# Patient Record
Sex: MALE | Race: BLACK OR AFRICAN AMERICAN | Employment: FULL TIME | URBAN - METROPOLITAN AREA
[De-identification: names, ages, dates, MRNs, and addresses within clinical notes are randomized per-mention and may not be internally consistent; named-entity substitution may affect disease eponyms.]

---

## 2024-02-09 ENCOUNTER — HOSPITAL ENCOUNTER (EMERGENCY)
Age: 34
Discharge: HOME | End: 2024-02-09
Payer: COMMERCIAL

## 2024-02-09 VITALS
HEART RATE: 70 BPM | RESPIRATION RATE: 20 BRPM | OXYGEN SATURATION: 99 % | TEMPERATURE: 98 F | SYSTOLIC BLOOD PRESSURE: 157 MMHG | DIASTOLIC BLOOD PRESSURE: 101 MMHG

## 2024-02-09 DIAGNOSIS — K21.9 GASTRIC REFLUX: Primary | ICD-10-CM

## 2024-02-09 PROCEDURE — 6370000000 HC RX 637 (ALT 250 FOR IP): Performed by: PHYSICIAN ASSISTANT

## 2024-02-09 PROCEDURE — 99283 EMERGENCY DEPT VISIT LOW MDM: CPT

## 2024-02-09 RX ORDER — FAMOTIDINE 20 MG/1
20 TABLET, FILM COATED ORAL ONCE
Status: COMPLETED | OUTPATIENT
Start: 2024-02-09 | End: 2024-02-09

## 2024-02-09 RX ORDER — CALCIUM CARBONATE 500 MG/1
1 TABLET, CHEWABLE ORAL 2 TIMES DAILY
Qty: 60 TABLET | Refills: 0 | Status: SHIPPED | OUTPATIENT
Start: 2024-02-09 | End: 2024-03-10

## 2024-02-09 RX ORDER — FAMOTIDINE 20 MG/1
20 TABLET, FILM COATED ORAL 2 TIMES DAILY
Qty: 30 TABLET | Refills: 0 | Status: SHIPPED | OUTPATIENT
Start: 2024-02-09

## 2024-02-09 RX ADMIN — FAMOTIDINE 20 MG: 20 TABLET, FILM COATED ORAL at 18:50

## 2024-02-09 ASSESSMENT — PAIN SCALES - GENERAL: PAINLEVEL_OUTOF10: 9

## 2024-02-09 ASSESSMENT — PAIN - FUNCTIONAL ASSESSMENT: PAIN_FUNCTIONAL_ASSESSMENT: 0-10

## 2024-02-09 NOTE — ED PROVIDER NOTES
[] Radiologist's Report Reviewed:  No orders to display       EKG (if obtained):   Please See Note of attending physician for EKG interpretation.     Chart review shows recent radiograph(s):  No results found.    MDM:     Problems Addressed:  1. Gastric reflux        Interventions given this visit:   Orders Placed This Encounter   Medications    famotidine (PEPCID) tablet 20 mg    famotidine (PEPCID) 20 MG tablet     Sig: Take 1 tablet by mouth 2 times daily     Dispense:  30 tablet     Refill:  0    calcium carbonate (ANTACID) 500 MG chewable tablet     Sig: Take 1 tablet by mouth 2 times daily     Dispense:  60 tablet     Refill:  0      Interventions listed are used to treat Problem list above  Pt presents today with s/sx consistent with reflux. Interventions as above.  Pt will be provided with RX for symptomatic control and educated on return precautions.     .   -DDX   GERD, gatritis       I independently managed patient today in the ED.       BP (!) 157/101   Pulse 70   Temp 98 °F (36.7 °C) (Oral)   Resp 20   SpO2 99%       Clinical Impression:  1. Gastric reflux        Disposition referral (if applicable):  [facility] Emergency Department  [address]  [place] [address]  [phone]    If symptoms worsen or persist    Disposition medications (if applicable):  New Prescriptions    CALCIUM CARBONATE (ANTACID) 500 MG CHEWABLE TABLET    Take 1 tablet by mouth 2 times daily    FAMOTIDINE (PEPCID) 20 MG TABLET    Take 1 tablet by mouth 2 times daily         Comment: Please note this report has been produced using speech recognition software and may contain errors related to that system including errors in grammar, punctuation, and spelling, as well as words and phrases that may be inappropriate. If there are any questions or concerns please feel free to contact the dictating provider for clarification.    Please note Images are personally interpreted by  this Provider (ANGELA Couch. )However final disposition is made with deference to Radiologist interpretation of said images.       LIGIA Mcintyre Jeremy A, PA-C  02/09/24 1908

## 2024-02-09 NOTE — ED TRIAGE NOTES
Patient to triage with c/o pain after eating. Patient states the pain is in his esophagus and it happens every time he eats. Patient also states his tongue is turning white after brushing his teeth. Resps even and unlabored.

## 2024-05-10 ENCOUNTER — APPOINTMENT (OUTPATIENT)
Dept: CT IMAGING | Age: 34
End: 2024-05-10
Payer: COMMERCIAL

## 2024-05-10 ENCOUNTER — HOSPITAL ENCOUNTER (EMERGENCY)
Age: 34
Discharge: HOME | End: 2024-05-10
Attending: EMERGENCY MEDICINE
Payer: COMMERCIAL

## 2024-05-10 VITALS
OXYGEN SATURATION: 98 % | TEMPERATURE: 98.4 F | RESPIRATION RATE: 16 BRPM | DIASTOLIC BLOOD PRESSURE: 87 MMHG | HEART RATE: 72 BPM | SYSTOLIC BLOOD PRESSURE: 135 MMHG

## 2024-05-10 DIAGNOSIS — R10.9 ABDOMINAL PAIN, UNSPECIFIED ABDOMINAL LOCATION: Primary | ICD-10-CM

## 2024-05-10 LAB
ALBUMIN SERPL-MCNC: 4.5 GM/DL (ref 3.4–5)
ALP BLD-CCNC: 104 IU/L (ref 40–129)
ALT SERPL-CCNC: 30 U/L (ref 10–40)
ANION GAP SERPL CALCULATED.3IONS-SCNC: 9 MMOL/L (ref 7–16)
AST SERPL-CCNC: 21 IU/L (ref 15–37)
BASOPHILS ABSOLUTE: 0 K/CU MM
BASOPHILS RELATIVE PERCENT: 0.3 % (ref 0–1)
BILIRUB SERPL-MCNC: 0.3 MG/DL (ref 0–1)
BUN SERPL-MCNC: 17 MG/DL (ref 6–23)
CALCIUM SERPL-MCNC: 9.1 MG/DL (ref 8.3–10.6)
CHLORIDE BLD-SCNC: 101 MMOL/L (ref 99–110)
CO2: 26 MMOL/L (ref 21–32)
CREAT SERPL-MCNC: 1.1 MG/DL (ref 0.9–1.3)
DIFFERENTIAL TYPE: ABNORMAL
EOSINOPHILS ABSOLUTE: 0.2 K/CU MM
EOSINOPHILS RELATIVE PERCENT: 2.4 % (ref 0–3)
GFR, ESTIMATED: 90 ML/MIN/1.73M2
GLUCOSE SERPL-MCNC: 86 MG/DL (ref 70–99)
HCT VFR BLD CALC: 45.3 % (ref 42–52)
HEMOGLOBIN: 14.5 GM/DL (ref 13.5–18)
IMMATURE NEUTROPHIL %: 0.2 % (ref 0–0.43)
LYMPHOCYTES ABSOLUTE: 2.6 K/CU MM
LYMPHOCYTES RELATIVE PERCENT: 38.6 % (ref 24–44)
MCH RBC QN AUTO: 25.4 PG (ref 27–31)
MCHC RBC AUTO-ENTMCNC: 32 % (ref 32–36)
MCV RBC AUTO: 79.5 FL (ref 78–100)
MONOCYTES ABSOLUTE: 0.6 K/CU MM
MONOCYTES RELATIVE PERCENT: 8.4 % (ref 0–4)
NEUTROPHILS ABSOLUTE: 3.3 K/CU MM
NEUTROPHILS RELATIVE PERCENT: 50.1 % (ref 36–66)
NUCLEATED RBC %: 0 %
PDW BLD-RTO: 13.5 % (ref 11.7–14.9)
PLATELET # BLD: 151 K/CU MM (ref 140–440)
PMV BLD AUTO: 10.6 FL (ref 7.5–11.1)
POTASSIUM SERPL-SCNC: 3.6 MMOL/L (ref 3.5–5.1)
RBC # BLD: 5.7 M/CU MM (ref 4.6–6.2)
SODIUM BLD-SCNC: 136 MMOL/L (ref 135–145)
TOTAL IMMATURE NEUTOROPHIL: 0.01 K/CU MM
TOTAL NUCLEATED RBC: 0 K/CU MM
TOTAL PROTEIN: 7.5 GM/DL (ref 6.4–8.2)
WBC # BLD: 6.7 K/CU MM (ref 4–10.5)

## 2024-05-10 PROCEDURE — 6360000004 HC RX CONTRAST MEDICATION: Performed by: EMERGENCY MEDICINE

## 2024-05-10 PROCEDURE — C9113 INJ PANTOPRAZOLE SODIUM, VIA: HCPCS | Performed by: EMERGENCY MEDICINE

## 2024-05-10 PROCEDURE — 6360000002 HC RX W HCPCS: Performed by: EMERGENCY MEDICINE

## 2024-05-10 PROCEDURE — 80053 COMPREHEN METABOLIC PANEL: CPT

## 2024-05-10 PROCEDURE — 96374 THER/PROPH/DIAG INJ IV PUSH: CPT

## 2024-05-10 PROCEDURE — 85025 COMPLETE CBC W/AUTO DIFF WBC: CPT

## 2024-05-10 PROCEDURE — 99285 EMERGENCY DEPT VISIT HI MDM: CPT

## 2024-05-10 PROCEDURE — 74177 CT ABD & PELVIS W/CONTRAST: CPT

## 2024-05-10 RX ORDER — OMEPRAZOLE 40 MG/1
40 CAPSULE, DELAYED RELEASE ORAL
Qty: 14 CAPSULE | Refills: 0 | Status: SHIPPED | OUTPATIENT
Start: 2024-05-10 | End: 2024-05-24

## 2024-05-10 RX ORDER — PANTOPRAZOLE SODIUM 40 MG/10ML
40 INJECTION, POWDER, LYOPHILIZED, FOR SOLUTION INTRAVENOUS ONCE
Status: COMPLETED | OUTPATIENT
Start: 2024-05-10 | End: 2024-05-10

## 2024-05-10 RX ADMIN — IOPAMIDOL 75 ML: 755 INJECTION, SOLUTION INTRAVENOUS at 18:57

## 2024-05-10 RX ADMIN — PANTOPRAZOLE SODIUM 40 MG: 40 INJECTION, POWDER, FOR SOLUTION INTRAVENOUS at 18:10

## 2024-05-10 ASSESSMENT — PAIN SCALES - GENERAL
PAINLEVEL_OUTOF10: 6
PAINLEVEL_OUTOF10: 0

## 2024-05-10 ASSESSMENT — LIFESTYLE VARIABLES
HOW OFTEN DO YOU HAVE A DRINK CONTAINING ALCOHOL: NEVER
HOW MANY STANDARD DRINKS CONTAINING ALCOHOL DO YOU HAVE ON A TYPICAL DAY: PATIENT DOES NOT DRINK

## 2024-05-10 ASSESSMENT — PAIN DESCRIPTION - DESCRIPTORS: DESCRIPTORS: ACHING;CRAMPING

## 2024-05-10 ASSESSMENT — PAIN DESCRIPTION - LOCATION: LOCATION: ABDOMEN

## 2024-05-10 ASSESSMENT — PAIN - FUNCTIONAL ASSESSMENT: PAIN_FUNCTIONAL_ASSESSMENT: 0-10

## 2024-05-10 NOTE — CARE COORDINATION
CM review of pt chart for discharge needs. Pt is Indonesian creole vernacular. Stateless Community [employer]igator community program placed in pt AVS.

## 2024-05-10 NOTE — ED PROVIDER NOTES
heart sounds.    Perfusion:  intact  Respiratory:  Lungs clear to auscultation bilaterally.  Respirations nonlabored.     Abdominal:  Normal bowel sounds.  Soft.  Nontender.  Non distended.  Back:  No CVA tenderness to palpation     Neurological:  Alert and oriented times 3.  No focal neuro deficits.             Psychiatric:  Appropriate    I have reviewed and interpreted all of the currently available lab results from this visit (if applicable):  Results for orders placed or performed during the hospital encounter of 05/10/24   CBC with Auto Differential   Result Value Ref Range    WBC 6.7 4.0 - 10.5 K/CU MM    RBC 5.70 4.6 - 6.2 M/CU MM    Hemoglobin 14.5 13.5 - 18.0 GM/DL    Hematocrit 45.3 42 - 52 %    MCV 79.5 78 - 100 FL    MCH 25.4 (L) 27 - 31 PG    MCHC 32.0 32.0 - 36.0 %    RDW 13.5 11.7 - 14.9 %    Platelets 151 140 - 440 K/CU MM    MPV 10.6 7.5 - 11.1 FL    Differential Type AUTOMATED DIFFERENTIAL     Neutrophils % 50.1 36 - 66 %    Lymphocytes % 38.6 24 - 44 %    Monocytes % 8.4 (H) 0 - 4 %    Eosinophils % 2.4 0 - 3 %    Basophils % 0.3 0 - 1 %    Neutrophils Absolute 3.3 K/CU MM    Lymphocytes Absolute 2.6 K/CU MM    Monocytes Absolute 0.6 K/CU MM    Eosinophils Absolute 0.2 K/CU MM    Basophils Absolute 0.0 K/CU MM    Nucleated RBC % 0.0 %    Total Nucleated RBC 0.0 K/CU MM    Total Immature Neutrophil 0.01 K/CU MM    Immature Neutrophil % 0.2 0 - 0.43 %   Comprehensive Metabolic Panel   Result Value Ref Range    Sodium 136 135 - 145 MMOL/L    Potassium 3.6 3.5 - 5.1 MMOL/L    Chloride 101 99 - 110 mMol/L    CO2 26 21 - 32 MMOL/L    Anion Gap 9 7 - 16    Glucose 86 70 - 99 MG/DL    BUN 17 6 - 23 MG/DL    Creatinine 1.1 0.9 - 1.3 MG/DL    Est, Glom Filt Rate 90 >60 mL/min/1.73m2    Calcium 9.1 8.3 - 10.6 MG/DL    Total Protein 7.5 6.4 - 8.2 GM/DL    Albumin 4.5 3.4 - 5.0 GM/DL    Total Bilirubin 0.3 0.0 - 1.0 MG/DL    Alkaline Phosphatase 104 40 - 129 IU/L    ALT 30 10 - 40 U/L    AST 21 15 - 37 IU/L

## 2024-05-10 NOTE — DISCHARGE INSTRUCTIONS
Please take Prilosec as instructed until gone.  You should go to pharmacy of choice to fill this prescription tomorrow    [facility]  2-1-1:   Estherraylaury mccann john valladares  [phone] o 2-1-1  [email or web address]    Javier Dang:  koupon lizzette short, laura mosquera, leeri sibvansyone, [facility]  [phone]    [facility]:   roderick short lwrivas, rad ak mèb  [phone] or [phone]    Chuobdulia lopess:   roderick short ak lwrivas, rad ak b  [phone]    [facility] [address]:     [facility]  [address] [place] [address]  [phone]     estelle kathi mccabemi yo  [address] [place] [address]  [phone]     [facility]:   [facility] leo, [facility] ou  [address] [place], [place]   [phone] o [phone]    Malcolm armenta [facility]:  asistans lizzette [facility]: [phone]  [email or web address]: asistans lizzette [facility]: [facility] leo, [facility] diggs revni ou  [address]   [place] [address]  [phone]    [employer] [place]:  Nitrisyon lizzette tibhema ak robbie villalobos selon revni  [address], [place] [address] [phone]    [facility]:  ofri sèvis otobis [facility]. ADA ofri sèvis pòt an pòt lizzette moun ki gen andikap  [phone]    Familia rivera vwayaj:    [phone]

## 2024-05-11 NOTE — ED NOTES
Patient discharging home, AVS reviewed with no questions at this time. Patient instrcuted to follow up per discharge instructions and to return for worsening symptoms. Respirations equal and unlabored, skin WD.

## 2024-07-26 ENCOUNTER — HOSPITAL ENCOUNTER (OUTPATIENT)
Dept: GENERAL RADIOLOGY | Age: 34
Discharge: HOME | End: 2024-07-26
Payer: COMMERCIAL

## 2024-07-26 ENCOUNTER — HOSPITAL ENCOUNTER (OUTPATIENT)
Age: 34
Discharge: HOME | End: 2024-07-26
Payer: COMMERCIAL

## 2024-07-26 DIAGNOSIS — R07.81 RIB PAIN: ICD-10-CM

## 2024-07-26 DIAGNOSIS — M54.50 LUMBAR PAIN: ICD-10-CM

## 2024-07-26 PROCEDURE — 71110 X-RAY EXAM RIBS BIL 3 VIEWS: CPT

## 2024-07-26 PROCEDURE — 72100 X-RAY EXAM L-S SPINE 2/3 VWS: CPT

## 2025-04-16 ENCOUNTER — HOSPITAL ENCOUNTER (OUTPATIENT)
Dept: INFUSION THERAPY | Age: 35
Discharge: HOME | End: 2025-04-16
Payer: COMMERCIAL

## 2025-04-16 ENCOUNTER — INITIAL CONSULT (OUTPATIENT)
Dept: ONCOLOGY | Age: 35
End: 2025-04-16
Payer: COMMERCIAL

## 2025-04-16 VITALS
DIASTOLIC BLOOD PRESSURE: 81 MMHG | OXYGEN SATURATION: 100 % | HEART RATE: 64 BPM | BODY MASS INDEX: 25.4 KG/M2 | WEIGHT: 177.4 LBS | TEMPERATURE: 98.7 F | SYSTOLIC BLOOD PRESSURE: 114 MMHG | HEIGHT: 70 IN

## 2025-04-16 DIAGNOSIS — D69.6 THROMBOCYTOPENIA: ICD-10-CM

## 2025-04-16 DIAGNOSIS — D69.6 THROMBOCYTOPENIA: Primary | ICD-10-CM

## 2025-04-16 LAB
ALBUMIN SERPL-MCNC: 4.3 G/DL (ref 3.4–5)
ALBUMIN/GLOB SERPL: 1.4 {RATIO} (ref 1.1–2.2)
ALP SERPL-CCNC: 136 U/L (ref 40–129)
ALT SERPL-CCNC: 71 U/L (ref 10–40)
ANION GAP SERPL CALCULATED.3IONS-SCNC: 9 MMOL/L (ref 9–17)
AST SERPL-CCNC: 47 U/L (ref 15–37)
BASOPHILS # BLD: 0.02 K/UL
BASOPHILS NFR BLD: 0 % (ref 0–1)
BILIRUB SERPL-MCNC: 0.5 MG/DL (ref 0–1)
BUN SERPL-MCNC: 16 MG/DL (ref 7–20)
CALCIUM SERPL-MCNC: 9.5 MG/DL (ref 8.3–10.6)
CHLORIDE SERPL-SCNC: 102 MMOL/L (ref 99–110)
CO2 SERPL-SCNC: 26 MMOL/L (ref 21–32)
CREAT SERPL-MCNC: 1.1 MG/DL (ref 0.9–1.3)
EOSINOPHIL # BLD: 0.12 K/UL
EOSINOPHILS RELATIVE PERCENT: 2 % (ref 0–3)
ERYTHROCYTE [DISTWIDTH] IN BLOOD BY AUTOMATED COUNT: 14.4 % (ref 11.7–14.9)
FOLATE SERPL-MCNC: 11.7 NG/ML (ref 4.8–24.2)
GFR, ESTIMATED: 77 ML/MIN/1.73M2
GLUCOSE SERPL-MCNC: 104 MG/DL (ref 74–99)
HAPTOGLOB SERPL-MCNC: 85 MG/DL (ref 30–200)
HBV CORE IGM SERPL QL IA: NONREACTIVE
HCT VFR BLD AUTO: 44.6 % (ref 42–52)
HGB BLD-MCNC: 14.3 G/DL (ref 13.5–18)
INR PPP: 1.1
LDH SERPL-CCNC: 176 U/L (ref 100–190)
LYMPHOCYTES NFR BLD: 2.13 K/UL
LYMPHOCYTES RELATIVE PERCENT: 38 % (ref 24–44)
MCH RBC QN AUTO: 25.9 PG (ref 27–31)
MCHC RBC AUTO-ENTMCNC: 32.1 G/DL (ref 32–36)
MCV RBC AUTO: 80.8 FL (ref 78–100)
MONOCYTES NFR BLD: 0.42 K/UL
MONOCYTES NFR BLD: 8 % (ref 0–4)
NEUTROPHILS NFR BLD: 52 % (ref 36–66)
NEUTS SEG NFR BLD: 2.94 K/UL
PARTIAL THROMBOPLASTIN TIME: 33.8 SEC (ref 25.1–37.1)
PLATELET # BLD AUTO: 133 K/UL (ref 140–440)
PMV BLD AUTO: 11.1 FL (ref 7.5–11.1)
POTASSIUM SERPL-SCNC: 4.8 MMOL/L (ref 3.5–5.1)
PROT SERPL-MCNC: 7.3 G/DL (ref 6.4–8.2)
PROTHROMBIN TIME: 14.1 SEC (ref 11.7–14.5)
RBC # BLD AUTO: 5.52 M/UL (ref 4.6–6.2)
RETICS # AUTO: 0.06 M/UL
RETICS/RBC NFR AUTO: 1.1 % (ref 0.2–2)
SODIUM SERPL-SCNC: 137 MMOL/L (ref 136–145)
VIT B12 SERPL-MCNC: 687 PG/ML (ref 211–911)
WBC OTHER # BLD: 5.6 K/UL (ref 4–10.5)

## 2025-04-16 PROCEDURE — 83615 LACTATE (LD) (LDH) ENZYME: CPT

## 2025-04-16 PROCEDURE — 82746 ASSAY OF FOLIC ACID SERUM: CPT

## 2025-04-16 PROCEDURE — 99202 OFFICE O/P NEW SF 15 MIN: CPT

## 2025-04-16 PROCEDURE — 85730 THROMBOPLASTIN TIME PARTIAL: CPT

## 2025-04-16 PROCEDURE — 86039 ANTINUCLEAR ANTIBODIES (ANA): CPT

## 2025-04-16 PROCEDURE — 82607 VITAMIN B-12: CPT

## 2025-04-16 PROCEDURE — 83010 ASSAY OF HAPTOGLOBIN QUANT: CPT

## 2025-04-16 PROCEDURE — 84165 PROTEIN E-PHORESIS SERUM: CPT

## 2025-04-16 PROCEDURE — 99204 OFFICE O/P NEW MOD 45 MIN: CPT | Performed by: INTERNAL MEDICINE

## 2025-04-16 PROCEDURE — 84155 ASSAY OF PROTEIN SERUM: CPT

## 2025-04-16 PROCEDURE — 86705 HEP B CORE ANTIBODY IGM: CPT

## 2025-04-16 PROCEDURE — 85610 PROTHROMBIN TIME: CPT

## 2025-04-16 PROCEDURE — 87350 HEPATITIS BE AG IA: CPT

## 2025-04-16 PROCEDURE — 85045 AUTOMATED RETICULOCYTE COUNT: CPT

## 2025-04-16 PROCEDURE — 85025 COMPLETE CBC W/AUTO DIFF WBC: CPT

## 2025-04-16 PROCEDURE — 86038 ANTINUCLEAR ANTIBODIES: CPT

## 2025-04-16 PROCEDURE — 86023 IMMUNOGLOBULIN ASSAY: CPT

## 2025-04-16 PROCEDURE — 86707 HEPATITIS BE ANTIBODY: CPT

## 2025-04-16 PROCEDURE — 86431 RHEUMATOID FACTOR QUANT: CPT

## 2025-04-16 PROCEDURE — 80053 COMPREHEN METABOLIC PANEL: CPT

## 2025-04-16 PROCEDURE — 36415 COLL VENOUS BLD VENIPUNCTURE: CPT

## 2025-04-16 RX ORDER — LORATADINE 10 MG
TABLET ORAL
COMMUNITY
Start: 2025-03-18

## 2025-04-16 ASSESSMENT — PATIENT HEALTH QUESTIONNAIRE - PHQ9
2. FEELING DOWN, DEPRESSED OR HOPELESS: NOT AT ALL
SUM OF ALL RESPONSES TO PHQ QUESTIONS 1-9: 0
1. LITTLE INTEREST OR PLEASURE IN DOING THINGS: NOT AT ALL

## 2025-04-16 NOTE — PROGRESS NOTES
MA Rooming Questions  Patient: Cassien Ulysse  MRN: [id number]    Date: 4/16/2025        NEW PATIENT   5. Did the patient have a depression screening completed today? Yes    PHQ-9 Total Score: 0 (4/16/2025  2:30 PM)       PHQ-9 Given to (if applicable):               PHQ-9 Score (if applicable):                     [] Positive     []  Negative              Does question #9 need addressed (if applicable)                     [] Yes    []  No               Jacki Neville, FAUSTINA    
Total Score: 0 (4/16/2025  2:30 PM)       Treatment History:  C1D1  C2D1  C3D1  C4D1  C5D1  C6D1      MANDO:  JO ANN:    Assessment & Plan:                                                          Consulted for thrombocytopenia: Will obtain Recent CBC results.  Reviewed medications. Ct abdomen and pelvis in may 2024 with no evidence of splenomegaly.  CBC was also normal in May 2024.  Will rule out hemolysis, monoclonal myopathy, nutritional deficiency, rheumatoid arthritis or connective tissue disorder.  Also rule out hepatitis or HIV.  May have underlying ITP.,  Platelet antibodies will be ordered as well.  Further recommendations pending above.    Epigastric pain quadrant pain and reflux symptoms.  He is on PPI.  Recommend that he follows with PCP, may need further GI evaluation including an upper endoscopy.  Ultrasound of the abdomen ordered    Constipation, recommend stool softener and laxatives as needed.  Adequate fluid intake.    Lower back pain, etiology not very clear.  Defer further management per PCP.    Continue other medical care.    Thank you for letting us be part of the care and will follow along.    Discussed above findings and plan with him and he voiced understanding.  Answered all his questions.    Discussed healthy lifestyle including healthy diet, regular exercise as tolerated.  Also discussed importance of being up-to-date with age-appropriate screening tools.    Recommend follow-up with primary care physician and other specialists.    Please do not hesitate to contact us if you need further information.    Return to clinic 1M  or earlier if new Sx    This note is created with the assistance of a speech-recognition program. While intending to generate a document that accurately reflects the content of the visit, no guarantee can be provided that every mistake has been identified and corrected by editing.    Portions of this note are copied forward from previous clinic note.  Interval

## 2025-04-17 LAB — RHEUMATOID FACTOR: <10 IU/ML

## 2025-04-18 LAB
ANTI-NUCLEAR ANTIBODY (ANA): NEGATIVE
HBV E AB SERPL QL IA: NEGATIVE
HBV E AG SERPL QL IA: NEGATIVE

## 2025-04-24 LAB
MISCELLANEOUS LAB TEST RESULT: NORMAL
TEST NAME: NORMAL

## 2025-05-02 ENCOUNTER — HOSPITAL ENCOUNTER (OUTPATIENT)
Dept: ULTRASOUND IMAGING | Age: 35
Discharge: HOME | End: 2025-05-02
Attending: INTERNAL MEDICINE
Payer: COMMERCIAL

## 2025-05-02 DIAGNOSIS — D69.6 THROMBOCYTOPENIA: ICD-10-CM

## 2025-05-02 PROCEDURE — 76705 ECHO EXAM OF ABDOMEN: CPT

## 2025-05-22 ENCOUNTER — OFFICE VISIT (OUTPATIENT)
Dept: ONCOLOGY | Age: 35
End: 2025-05-22
Payer: COMMERCIAL

## 2025-05-22 ENCOUNTER — HOSPITAL ENCOUNTER (OUTPATIENT)
Dept: INFUSION THERAPY | Age: 35
Discharge: HOME | End: 2025-05-22
Payer: COMMERCIAL

## 2025-05-22 VITALS
HEART RATE: 57 BPM | SYSTOLIC BLOOD PRESSURE: 117 MMHG | TEMPERATURE: 97.6 F | OXYGEN SATURATION: 100 % | DIASTOLIC BLOOD PRESSURE: 74 MMHG | HEIGHT: 70 IN | WEIGHT: 182.6 LBS | BODY MASS INDEX: 26.14 KG/M2

## 2025-05-22 DIAGNOSIS — D69.6 THROMBOCYTOPENIA: Primary | ICD-10-CM

## 2025-05-22 PROCEDURE — 99212 OFFICE O/P EST SF 10 MIN: CPT

## 2025-05-22 PROCEDURE — 99213 OFFICE O/P EST LOW 20 MIN: CPT | Performed by: INTERNAL MEDICINE

## 2025-05-22 NOTE — PROGRESS NOTES
Patient Name:  Cassien Ulysse  Patient :  1990  Patient MRN:  [id number]     Primary Oncologist: Kendal Arellano MD  Referring Provider: Roma Yanez MD     Date of Service: 2025      Reason for Consult:      Chief Complaint:    Chief Complaint   Patient presents with    Follow-up       Encounter Diagnosis   Name Primary?    Thrombocytopenia Yes        HPI:   25 very pleasant 35-year-old male was seen for thrombocytopenia.  Used  services.  He denied any overt bleeding other than mild nosebleeds.  Denied any rash.  No chest pain or increased shortness of breath.  Reported mild epigastric pain.  Reported acid reflux.  No major weight changes.  Denied any lower extremity edema.  Reported he has some soreness and whitish coloration of the tongue even after he cleans and brushes.  No fever.Reported lower back pain for the past 3M. No injury.    2024 platelet count of 145 differential within normal limits hepatitis C negative HIV negative QuantiFERON negative    3/18/2025 WBC of 5.7 hemoglobin of 14.3 hematocrit of 47 MCV of 84 and platelets 128    3/19/2025 platelets 122K     May 10, 2024 CBC within normal limitsCMP within normal limits as well    CT scan of the chest abdomen pelvis in May 2024 with no evidence of splenomegaly or hepatomegaly    2025 ultrasound of the abdomen with no evidence of hepatomegaly or splenomegaly.    NA negative rheumatoid factor less than 10 hepatitis B negative CBC with WBC of 5.6 hemoglobin of 14.3 hematocrit 44.6 MCV of 80.8 and platelets of 133 CMP with a sodium of 137 potassium of 4.8 BUN of 16 creatinine 1.1 alk phos 136 ALT 71 AST 47 B12 and folic acid 687 and 11.7  haptoglobin 85 reticulocyte count of 1.1 INR 1.1 PTT 33.8 serum protein electrophoresis normal.  Platelet antibody negative    Past Medical History:     Gerd                                                             Past Surgery History:    None

## 2025-05-22 NOTE — PROGRESS NOTES
MA Rooming Questions  Patient: Cassien Ulysse  MRN: [id number]    Date: 5/22/2025        1. Do you have any new issues?   no         2. Do you need any refills on medications?    no    3. Have you had any imaging done since your last visit?   yes - u/s abdomen 5/2    4. Have you been hospitalized or seen in the emergency room since your last visit here?   no    5. Did the patient have a depression screening completed today? No    No data recorded     PHQ-9 Given to (if applicable):               PHQ-9 Score (if applicable):                     [] Positive     []  Negative              Does question #9 need addressed (if applicable)                     [] Yes    []  No               Nyasia Ceja CMA

## 2025-07-23 ENCOUNTER — HOSPITAL ENCOUNTER (OUTPATIENT)
Dept: INFUSION THERAPY | Age: 35
Discharge: HOME | End: 2025-07-23
Payer: COMMERCIAL

## 2025-07-23 DIAGNOSIS — D69.6 THROMBOCYTOPENIA: ICD-10-CM

## 2025-07-23 LAB
ALBUMIN SERPL-MCNC: 4.6 G/DL (ref 3.4–5)
ALBUMIN/GLOB SERPL: 1.6 {RATIO} (ref 1.1–2.2)
ALP SERPL-CCNC: 110 U/L (ref 40–129)
ALT SERPL-CCNC: 69 U/L (ref 10–40)
ANION GAP SERPL CALCULATED.3IONS-SCNC: 10 MMOL/L (ref 9–17)
AST SERPL-CCNC: 77 U/L (ref 15–37)
BASOPHILS # BLD: 0.01 K/UL
BASOPHILS NFR BLD: 0 % (ref 0–1)
BILIRUB SERPL-MCNC: 0.6 MG/DL (ref 0–1)
BUN SERPL-MCNC: 23 MG/DL (ref 7–20)
CALCIUM SERPL-MCNC: 9.4 MG/DL (ref 8.3–10.6)
CHLORIDE SERPL-SCNC: 99 MMOL/L (ref 99–110)
CO2 SERPL-SCNC: 28 MMOL/L (ref 21–32)
CREAT SERPL-MCNC: 1.2 MG/DL (ref 0.9–1.3)
EOSINOPHIL # BLD: 0.17 K/UL
EOSINOPHILS RELATIVE PERCENT: 3 % (ref 0–3)
ERYTHROCYTE [DISTWIDTH] IN BLOOD BY AUTOMATED COUNT: 14.9 % (ref 11.7–14.9)
GFR, ESTIMATED: 66 ML/MIN/1.73M2
GLUCOSE SERPL-MCNC: 79 MG/DL (ref 74–99)
HCT VFR BLD AUTO: 43.9 % (ref 42–52)
HCV AB SERPL QL IA: NONREACTIVE
HGB BLD-MCNC: 14.5 G/DL (ref 13.5–18)
HIV 1+2 AB+HIV1 P24 AG SERPL QL IA: NONREACTIVE
LYMPHOCYTES NFR BLD: 1.97 K/UL
LYMPHOCYTES RELATIVE PERCENT: 37 % (ref 24–44)
MCH RBC QN AUTO: 26.2 PG (ref 27–31)
MCHC RBC AUTO-ENTMCNC: 33 G/DL (ref 32–36)
MCV RBC AUTO: 79.4 FL (ref 78–100)
MONOCYTES NFR BLD: 0.57 K/UL
MONOCYTES NFR BLD: 11 % (ref 0–5)
NEUTROPHILS NFR BLD: 49 % (ref 36–66)
NEUTS SEG NFR BLD: 2.66 K/UL
PLATELET # BLD AUTO: 116 K/UL (ref 140–440)
PMV BLD AUTO: 12.7 FL (ref 7.5–11.1)
POTASSIUM SERPL-SCNC: 4.2 MMOL/L (ref 3.5–5.1)
PROT SERPL-MCNC: 7.4 G/DL (ref 6.4–8.2)
RBC # BLD AUTO: 5.53 M/UL (ref 4.6–6.2)
SODIUM SERPL-SCNC: 137 MMOL/L (ref 136–145)
WBC OTHER # BLD: 5.4 K/UL (ref 4–10.5)

## 2025-07-23 PROCEDURE — 87389 HIV-1 AG W/HIV-1&-2 AB AG IA: CPT

## 2025-07-23 PROCEDURE — 36415 COLL VENOUS BLD VENIPUNCTURE: CPT

## 2025-07-23 PROCEDURE — 86803 HEPATITIS C AB TEST: CPT

## 2025-07-23 PROCEDURE — 80053 COMPREHEN METABOLIC PANEL: CPT

## 2025-07-23 PROCEDURE — 85025 COMPLETE CBC W/AUTO DIFF WBC: CPT

## 2025-07-29 ENCOUNTER — OFFICE VISIT (OUTPATIENT)
Dept: ONCOLOGY | Age: 35
End: 2025-07-29
Payer: COMMERCIAL

## 2025-07-29 ENCOUNTER — HOSPITAL ENCOUNTER (OUTPATIENT)
Dept: INFUSION THERAPY | Age: 35
Discharge: HOME | End: 2025-07-29
Payer: COMMERCIAL

## 2025-07-29 VITALS
SYSTOLIC BLOOD PRESSURE: 126 MMHG | HEIGHT: 70 IN | BODY MASS INDEX: 26.37 KG/M2 | TEMPERATURE: 98.4 F | WEIGHT: 184.2 LBS | HEART RATE: 66 BPM | DIASTOLIC BLOOD PRESSURE: 76 MMHG | OXYGEN SATURATION: 100 %

## 2025-07-29 DIAGNOSIS — N28.9 RENAL INSUFFICIENCY: ICD-10-CM

## 2025-07-29 DIAGNOSIS — R74.8 ELEVATED LIVER ENZYMES: ICD-10-CM

## 2025-07-29 DIAGNOSIS — D69.6 THROMBOCYTOPENIA: Primary | ICD-10-CM

## 2025-07-29 PROCEDURE — 99214 OFFICE O/P EST MOD 30 MIN: CPT | Performed by: INTERNAL MEDICINE

## 2025-07-29 PROCEDURE — 99212 OFFICE O/P EST SF 10 MIN: CPT
